# Patient Record
Sex: MALE | Race: ASIAN | NOT HISPANIC OR LATINO | URBAN - METROPOLITAN AREA
[De-identification: names, ages, dates, MRNs, and addresses within clinical notes are randomized per-mention and may not be internally consistent; named-entity substitution may affect disease eponyms.]

---

## 2020-03-09 ENCOUNTER — COMMUNICATION - HEALTHEAST (OUTPATIENT)
Dept: SCHEDULING | Facility: CLINIC | Age: 50
End: 2020-03-09

## 2021-06-06 NOTE — TELEPHONE ENCOUNTER
Triage call:   Wife is the caller- no consent on file- not with patient- requesting to make an appointment  Having cold symptoms-   Cough with chest pain when he is coughing- not otherwise   Asthma - no wheezing within the past few days- last week some wheezing   Symptoms are on going for the last 3 weeks     Triaged to be seen today or tomorrow in the office- reviewed additional care advice with patients wife and she verbalizes understanding. Patient cold transferred to scheduling for appointment.     Lima Astorga RN BSBA [facility] Triage/Med Refill 3/9/2020 1:55 PM    Reason for Disposition    Patient wants to be seen    Protocols used: COMMON COLD-A-[place]